# Patient Record
(demographics unavailable — no encounter records)

---

## 2024-11-04 NOTE — HISTORY OF PRESENT ILLNESS
[de-identified] : Sore throat, cough, body chills x1 week. No fevers. Increased fatigue.  [FreeTextEntry6] : congested no vomiting, no diarrhea normal appetite

## 2024-11-04 NOTE — PHYSICAL EXAM
[TextEntry] : General:  no acute distress, alert   Ears:  clear tympanic membranes bilaterally  Nose:  pink nasal mucosa  Mouth:  nonerythematous oropharynx Neck:  supple   Lungs:  clear to auscultation bilaterally  Cardiac:  regular rate and rhythm  Abdomen:  soft, non tender, non distended  Lymphatics:  no abnormal lymph nodes palpated Skin:  warm

## 2025-06-03 NOTE — DISCUSSION/SUMMARY
[Met privately with the adolescent for part of the office visit?] : Met privately with the adolescent for part of the office visit? Yes [Adolescent demonstrates understanding of his/her conditions and how to take prescribed medications?] : Adolescent demonstrates understanding of his/her conditions and how to take prescribed medications? Yes [Adolescent asks questions during each office  visit and participates in the care plan?] : Adolescent asks questions during each office visit and participates in the care plan? Yes [Adolescent is competent in independently making appointments, filling prescriptions, following up on referrals, and seeking emergency services, as needed?] : Adolescent is competent in independently making appointments, filling prescriptions, following up on referrals, and seeking emergency services, as needed? Yes [FreeTextEntry1] : Continue balanced diet with all food groups. Brush teeth twice a day with toothbrush. Recommend visit to dentist. Maintain consistent daily routines and sleep schedule. Personal hygiene and sexual health reviewed. Risky behaviors assessed. School discussed. Limit screen time to no more than 2 hours per day. Encourage physical activity.  Did not get HPV # 2- does not want to get vaccine without her mother present, will return for vaccine with mother   Coordination of care reviewed   CRAURIT reviewed.   PHQ9 reviewed   Cardiac reviewed- no increased risk for SCD   5-2-1-0 reviewed  Passed vision and hearing screenings  Recommend Gyn consultation for nipple discharge and to establish care   Return 1 year for routine well child check or sooner if any concerns

## 2025-06-03 NOTE — HISTORY OF PRESENT ILLNESS
[Normal] : normal [LMP: _____] : LMP: [unfilled] [Cycle Length: _____ days] : Cycle Length: [unfilled] days [Days of Bleeding: _____] : Days of bleeding: [unfilled] [Eats meals with family] : eats meals with family [Has family members/adults to turn to for help] : has family members/adults to turn to for help [Is permitted and is able to make independent decisions] : Is permitted and is able to make independent decisions [Eats regular meals including adequate fruits and vegetables] : eats regular meals including adequate fruits and vegetables [Drinks non-sweetened liquids] : drinks non-sweetened liquids  [Calcium source] : calcium source [Has friends] : has friends [At least 1 hour of physical activity a day] : at least 1 hour of physical activity a day [Drinks alcohol] : drinks alcohol [No] : No cigarette smoke exposure [Uses safety belts/safety equipment] : uses safety belts/safety equipment  [Has peer relationships free of violence] : has peer relationships free of violence [Yes] : Patient has had sexual intercourse. [Has ways to cope with stress] : has ways to cope with stress [Displays self-confidence] : displays self-confidence [Gets depressed, anxious, or irritable/has mood swings] : gets depressed, anxious, or irritable/has mood swings [With Teen] : teen [NO] : No [HIV Screening Declined] : HIV Screening Declined [Sleep Concerns] : no sleep concerns [Has concerns about body or appearance] : does not have concerns about body or appearance [Screen time (except homework) less than 2 hours a day] : no screen time (except homework) less than 2 hours a day [Impaired/distracted driving] : no impaired/distracted driving [Has problems with sleep] : does not have problems with sleep [de-identified] : Self [FreeTextEntry7] : 18 yo wcc- doing well  [de-identified] : occasional white discharge from both nipples for last 2 weeks [de-identified] : can get HPV # 2 [de-identified] : Completed one semester at [employer], took this past semester off due to mental health issues, will be starting at [employer] [facility] [employer] in the Fall [de-identified] : vaping marijuana and nicotine [de-identified] : used condoms, no current boyfriend [de-identified] : Sees therapist 2x/week and psychiatrist 1x/month for anxiety and depression, takes Prozac, Olanzapine, Hydroxyzine and Trazadone, started to see new psychiatrist in February 2025- diagnosed with bipolar disorder, started on Lamotrigine but discontinued due to adverse side effects/panic attacks- was seen at [facility] [place], reports doing well on current medications [FreeTextEntry1] : Still has occasional hyperhidrosis of hands and feet